# Patient Record
Sex: MALE | Race: AMERICAN INDIAN OR ALASKA NATIVE
[De-identification: names, ages, dates, MRNs, and addresses within clinical notes are randomized per-mention and may not be internally consistent; named-entity substitution may affect disease eponyms.]

---

## 2019-12-04 ENCOUNTER — HOSPITAL ENCOUNTER (EMERGENCY)
Dept: HOSPITAL 46 - ED | Age: 34
Discharge: HOME | End: 2019-12-04
Payer: COMMERCIAL

## 2019-12-04 VITALS — HEIGHT: 73 IN | WEIGHT: 259.99 LBS | BODY MASS INDEX: 34.46 KG/M2

## 2019-12-04 DIAGNOSIS — F17.200: ICD-10-CM

## 2019-12-04 DIAGNOSIS — M10.9: Primary | ICD-10-CM

## 2019-12-04 DIAGNOSIS — Z88.8: ICD-10-CM

## 2019-12-04 DIAGNOSIS — J18.9: ICD-10-CM
